# Patient Record
Sex: FEMALE | Race: WHITE | Employment: UNEMPLOYED | URBAN - METROPOLITAN AREA
[De-identification: names, ages, dates, MRNs, and addresses within clinical notes are randomized per-mention and may not be internally consistent; named-entity substitution may affect disease eponyms.]

---

## 2017-05-23 ENCOUNTER — APPOINTMENT (OUTPATIENT)
Dept: GENERAL RADIOLOGY | Facility: CLINIC | Age: 52
End: 2017-05-23
Attending: PHYSICIAN ASSISTANT
Payer: COMMERCIAL

## 2017-05-23 ENCOUNTER — HOSPITAL ENCOUNTER (EMERGENCY)
Facility: CLINIC | Age: 52
Discharge: HOME | End: 2017-05-23
Attending: PHYSICIAN ASSISTANT | Admitting: PHYSICIAN ASSISTANT
Payer: COMMERCIAL

## 2017-05-23 VITALS
SYSTOLIC BLOOD PRESSURE: 137 MMHG | BODY MASS INDEX: 45.31 KG/M2 | WEIGHT: 293 LBS | DIASTOLIC BLOOD PRESSURE: 104 MMHG | RESPIRATION RATE: 16 BRPM | TEMPERATURE: 98.2 F | OXYGEN SATURATION: 98 %

## 2017-05-23 DIAGNOSIS — M79.671 RIGHT FOOT PAIN: Primary | ICD-10-CM

## 2017-05-23 PROCEDURE — 73630 X-RAY EXAM OF FOOT: CPT | Mod: RT

## 2017-05-23 PROCEDURE — 99213 OFFICE O/P EST LOW 20 MIN: CPT | Performed by: PHYSICIAN ASSISTANT

## 2017-05-23 PROCEDURE — 99213 OFFICE O/P EST LOW 20 MIN: CPT

## 2017-05-23 RX ORDER — CEPHALEXIN 500 MG/1
500 CAPSULE ORAL 4 TIMES DAILY
Qty: 28 CAPSULE | Refills: 0 | Status: SHIPPED | OUTPATIENT
Start: 2017-05-23 | End: 2017-05-30

## 2017-05-23 RX ORDER — PREDNISONE 20 MG/1
TABLET ORAL
Qty: 10 TABLET | Refills: 0 | Status: SHIPPED | OUTPATIENT
Start: 2017-05-23

## 2017-05-23 ASSESSMENT — ENCOUNTER SYMPTOMS
CONSTITUTIONAL NEGATIVE: 1
NEUROLOGICAL NEGATIVE: 1

## 2017-05-23 NOTE — ED PROVIDER NOTES
History     Chief Complaint   Patient presents with     Foot Pain     rt foot pain x 10 days - no known injury     HPI  Fe Palmer is a 52 year old female with hx HTN who presents with complaints of right foot pain for the past 10 weeks.  Describes pain across the dorsum of her foot to the base.  Pt describes increased pain with weightbearing and even with light palpation of the skin as well as with moving her toes up and down.  She also c/o associated swelling of the area as well as erythema to the skin.  Denies any preceding injury or trauma to her foot.  She denies hx similar symptoms.  Denies fevers, chills, or calf pain/swelling.  She has been taking Tylenol at home and icing the area without improvement.    I have reviewed the Medications, Allergies, Past Medical and Surgical History, and Social History in the Epic system.    Review of Systems   Constitutional: Negative.    Musculoskeletal:        Right foot pain and swelling   Skin:        Redness to top of right foot   Neurological: Negative.    All other systems reviewed and are negative.      Physical Exam   BP: (!) 137/104  Heart Rate: 96  Temp: 98.2  F (36.8  C)  Resp: 16  Weight: 135.2 kg (298 lb)  SpO2: 98 %  Physical Exam   Constitutional: She appears well-developed and well-nourished. No distress.   HENT:   Head: Normocephalic and atraumatic.   Cardiovascular: Intact distal pulses.    Musculoskeletal: Normal range of motion.        Right ankle: Normal.        Right foot: There is bony tenderness and swelling.        Feet:    Tenderness, swelling, and erythema to the dorsum of her right foot.  Patient describes increased pain with movement of her toes   Neurological: She is alert. She has normal strength. No sensory deficit.   Skin: Skin is warm and dry.       ED Course     ED Course     Procedures    Results for orders placed or performed during the hospital encounter of 05/23/17   Foot  XR, G/E 3 views, right    Narrative    XR FOOT RT G/E 3  VW 5/23/2017 1:04 PM    HISTORY: Injury. Tenderness across distal metatarsals.    COMPARISON: None.      Impression    IMPRESSION: 3 views of the right foot show no acute fracture or  malalignment.     CUONG OSORIO MD       Assessments & Plan (with Medical Decision Making)     Pt is a 52 year old female with hx HTN who presents with complaints of right foot pain for the past 10 weeks.  Describes pain across the dorsum of her foot to the base.  Pt describes increased pain with weightbearing and even with light palpation of the skin as well as with moving her toes up and down.  She also c/o associated swelling of the area as well as erythema to the skin.  Denies any preceding injury or trauma to her foot.  She denies hx similar symptoms.  Denies fevers, chills, or calf pain/swelling.  She has been taking Tylenol at home and icing the area without improvement.  Pt is afebrile on arrival.  Exam as above.  X-rays of right foot were negative for fracture or acute pathology.  Discussed results with patient.  Concern for cellulitis versus gout.  Hand-outs provided.    Patient was sent with Keflex and Prednisone (patient is not able to take NSAIDs due to kidney disease) and was instructed to follow-up in the podiatry clinic if no improvement in 5-7 days for continued care and management or sooner if new or worsening symptoms.  She is to return to the ED for persistent and/or worsening symptoms.  Patient expressed understanding of the diagnosis and plan and was discharged home in good condition.    I have reviewed the nursing notes.    I have reviewed the findings, diagnosis, plan and need for follow up with the patient.    Discharge Medication List as of 5/23/2017  2:03 PM      START taking these medications    Details   cephALEXin (KEFLEX) 500 MG capsule Take 1 capsule (500 mg) by mouth 4 times daily for 7 days, Disp-28 capsule, R-0, Local Print      predniSONE (DELTASONE) 20 MG tablet Take two tablets (= 40mg) each day for  5 (five) days, Disp-10 tablet, R-0, Local Print             Final diagnoses:   Right foot pain       5/23/2017   [facility] EMERGENCY DEPARTMENT     Loren Harris PA-C  05/23/17 1730

## 2017-05-23 NOTE — LETTER
[facility] EMERGENCY DEPARTMENT  [address] [address]  [phone]    May 23, 2017    Fe Palmer  [address] [facility] [address]  [phone] (home)     : 1965      To Whom it may concern:    Fe Palmer was seen in our Emergency Department today, May 23, 2017.  Please excuse from work on 17-17.  Thank you.    Sincerely,        Loren Harris

## 2017-05-23 NOTE — ED AVS SNAPSHOT
[facility] Emergency Department    [address] [facility] [address] [address]    Phone:  [phone]    Fax:  [phone]                                       Fe Palmer   MRN: [id number]    Department:  [facility] Emergency Department   Date of Visit:  5/23/2017           Patient Information     Date Of Birth          1965        Your diagnoses for this visit were:     Right foot pain        You were seen by Loren Harris PA-C.      Follow-up Information     Follow up with [facility] [place]. Call in 5 days.    Specialty:  Podiatry    Why:  As needed, For persistent symptoms    Contact information:    [address]  [address]  [facility] [address] [phone]        Follow up with [facility] Emergency Department.    Specialty:  EMERGENCY MEDICINE    Why:  As needed, If symptoms worsen    Contact information:    [address] [address] [phone]    Additional information:    The medical center is located at   [address] (between [facility] and   [facility] [address] in [place], [place] miles north   of [place]).      24 Hour Appointment Hotline       To make an appointment at any [facility] clinic, call [phone] ([phone]). If you don't have a family doctor or clinic, we will help you find one. [facility] clinics are conveniently located to serve the needs of you and your family.             Review of your medicines      START taking        Dose / Directions Last dose taken    cephALEXin 500 MG capsule   Commonly known as:  KEFLEX   Dose:  500 mg   Quantity:  28 capsule        Take 1 capsule (500 mg) by mouth 4 times daily for 7 days   Refills:  0        predniSONE 20 MG tablet   Commonly known as:  DELTASONE   Quantity:  10 tablet        Take two tablets (= 40mg) each day for 5 (five) days   Refills:  0          Our records show that you are taking the medicines listed below. If these are incorrect, please call  your family doctor or clinic.        Dose / Directions Last dose taken    albuterol 108 (90 BASE) MCG/ACT Inhaler   Commonly known as:  albuterol   Dose:  2 puff   Quantity:  1 Inhaler        Inhale 2 puffs into the lungs every 4 hours as needed for shortness of breath / dyspnea   Refills:  0        ASPIRIN PO   Dose:  325 mg        Take 325 mg by mouth daily   Refills:  0        ATORVASTATIN CALCIUM PO        Refills:  0        benzonatate 200 MG capsule   Commonly known as:  TESSALON   Dose:  200 mg   Quantity:  21 capsule        Take 1 capsule (200 mg) by mouth 3 times daily as needed for cough   Refills:  0        CLONIDINE HCL PO        Refills:  0        DIOVAN 160 MG Caps        1 CAPSULE BY MOUTH DAILY AT BEDTIME   Refills:  0        EFFEXOR 75 MG tablet   Generic drug:  venlafaxine        2 TABLETS BY MOUTH TWICE DAILY WITH FOOD   Refills:  0        * BENAZEPRIL HCL PO        Refills:  0        * LOTENSIN 20 MG tablet   Generic drug:  benazepril        1 TABLET BY MOUTH TWICE DAILY   Refills:  0        NITROSTAT 0.4 MG sublingual tablet   Generic drug:  nitroglycerin        1 TAB UNDER TONGUE EVERY 5 MIN AS NEEDED, UP TO 3 PER EPISODE   Refills:  0        PROZAC PO        Refills:  0        SERTRALINE HCL PO        Take by mouth daily   Refills:  0        TOLECTIN OR        Refills:  0        ZANTAC 150 MG tablet   Generic drug:  ranitidine        1 TABLET BY MOUTH TWICE DAILY   Refills:  0        ZOCOR 20 MG tablet   Generic drug:  simvastatin        1 TABLET BY MOUTH AT BEDTIME   Refills:  0        * Notice:  This list has 2 medication(s) that are the same as other medications prescribed for you. Read the directions carefully, and ask your doctor or other care provider to review them with you.            Prescriptions were sent or printed at these locations (2 Prescriptions)                   [facility] [place], [place] - [address]   [address] [address]    Telephone:   "[phone]   Fax:  [phone]   Hours:                  Printed at Department/Unit printer (2 of 2)         cephALEXin (KEFLEX) 500 MG capsule               predniSONE (DELTASONE) 20 MG tablet                Procedures and tests performed during your visit     Foot  XR, G/E 3 views, right      Orders Needing Specimen Collection     None      Pending Results     No orders found from 5/21/2017 to 5/24/2017.            Pending Culture Results     No orders found from 5/21/2017 to 5/24/2017.            Pending Results Instructions     If you had any lab results that were not finalized at the time of your Discharge, you can call the ED Lab Result RN at [phone]. You will be contacted by this team for any positive Lab results or changes in treatment. The nurses are available 7 days a week from 10A to 6:30P.  You can leave a message 24 hours per day and they will return your call.        Test Results From Your Hospital Stay        5/23/2017  1:33 PM      Narrative     XR FOOT RT G/E 3 VW 5/23/2017 1:04 PM    HISTORY: Injury. Tenderness across distal metatarsals.    COMPARISON: None.        Impression     IMPRESSION: 3 views of the right foot show no acute fracture or  malalignment.     CUONG OSORIO MD                Thank you for choosing [facility]       Thank you for choosing [facility] for your care. Our goal is always to provide you with excellent care. Hearing back from our patients is one way we can continue to improve our services. Please take a few minutes to complete the written survey that you may receive in the mail after you visit with us. Thank you!        [employer] Information     [employer] lets you send messages to your doctor, view your test results, renew your prescriptions, schedule appointments and more. To sign up, go to [email or web address] . Click on \"Log in\" on the left side of the screen, which will take you to the Welcome page. Then click on \"Sign up Now\" on the right side of the page.     You " will be asked to enter the access code listed below, as well as some personal information. Please follow the directions to create your username and password.     Your access code is: [email or web address]  Expires: 2017  2:03 PM     Your access code will  in 90 days. If you need help or a new code, please call your [place] clinic or [phone].        Care EveryWhere ID     This is your Care EveryWhere ID. This could be used by other organizations to access your [place] medical records  [id number]        After Visit Summary       This is your record. Keep this with you and show to your community pharmacist(s) and doctor(s) at your next visit.

## 2017-05-23 NOTE — ED AVS SNAPSHOT
[facility]    [address] [address]    Phone:  [phone]    Fax:  [phone]                                       Fe Palmer   MRN: [id number]    Department:  [facility] Department   Date of Visit:  5/23/2017           After Visit Summary Signature Page     I have received my discharge instructions, and my questions have been answered. I have discussed any challenges I see with this plan with the nurse or doctor.    ..........................................................................................................................................  Patient/Patient Representative Signature      ..........................................................................................................................................  Patient Representative Print Name and Relationship to Patient    ..................................................               ................................................  Date                                            Time    ..........................................................................................................................................  Reviewed by Signature/Title    ...................................................              ..............................................  Date                                                            Time